# Patient Record
Sex: FEMALE | Race: WHITE | NOT HISPANIC OR LATINO | Employment: OTHER | URBAN - METROPOLITAN AREA
[De-identification: names, ages, dates, MRNs, and addresses within clinical notes are randomized per-mention and may not be internally consistent; named-entity substitution may affect disease eponyms.]

---

## 2019-05-01 ENCOUNTER — APPOINTMENT (OUTPATIENT)
Dept: RADIOLOGY | Facility: MEDICAL CENTER | Age: 84
End: 2019-05-01
Attending: EMERGENCY MEDICINE
Payer: MEDICARE

## 2019-05-01 ENCOUNTER — HOSPITAL ENCOUNTER (OUTPATIENT)
Dept: RADIOLOGY | Facility: MEDICAL CENTER | Age: 84
End: 2019-05-01

## 2019-05-01 ENCOUNTER — HOSPITAL ENCOUNTER (OUTPATIENT)
Facility: MEDICAL CENTER | Age: 84
End: 2019-05-03
Attending: EMERGENCY MEDICINE | Admitting: INTERNAL MEDICINE
Payer: MEDICARE

## 2019-05-01 DIAGNOSIS — D72.829 LEUKOCYTOSIS, UNSPECIFIED TYPE: ICD-10-CM

## 2019-05-01 DIAGNOSIS — S27.0XXA TRAUMATIC PNEUMOTHORAX, INITIAL ENCOUNTER: ICD-10-CM

## 2019-05-01 DIAGNOSIS — S22.41XA CLOSED FRACTURE OF MULTIPLE RIBS OF RIGHT SIDE, INITIAL ENCOUNTER: ICD-10-CM

## 2019-05-01 DIAGNOSIS — R45.1 AGITATION: ICD-10-CM
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
## 2019-05-01 LAB
ANION GAP SERPL CALC-SCNC: 11 MMOL/L (ref 0–11.9)
APPEARANCE UR: CLEAR
BACTERIA #/AREA URNS HPF: ABNORMAL /HPF
BASOPHILS # BLD AUTO: 0.4 % (ref 0–1.8)
BASOPHILS # BLD: 0.07 K/UL (ref 0–0.12)
BILIRUB UR QL STRIP.AUTO: NEGATIVE
BUN SERPL-MCNC: 16 MG/DL (ref 8–22)
CALCIUM SERPL-MCNC: 9.4 MG/DL (ref 8.5–10.5)
CHLORIDE SERPL-SCNC: 106 MMOL/L (ref 96–112)
CO2 SERPL-SCNC: 25 MMOL/L (ref 20–33)
COLOR UR: YELLOW
CREAT SERPL-MCNC: 0.79 MG/DL (ref 0.5–1.4)
EOSINOPHIL # BLD AUTO: 0.02 K/UL (ref 0–0.51)
EOSINOPHIL NFR BLD: 0.1 % (ref 0–6.9)
EPI CELLS #/AREA URNS HPF: NEGATIVE /HPF
ERYTHROCYTE [DISTWIDTH] IN BLOOD BY AUTOMATED COUNT: 47.8 FL (ref 35.9–50)
GLUCOSE SERPL-MCNC: 138 MG/DL (ref 65–99)
GLUCOSE UR STRIP.AUTO-MCNC: NEGATIVE MG/DL
HCT VFR BLD AUTO: 43.7 % (ref 37–47)
HGB BLD-MCNC: 13.8 G/DL (ref 12–16)
HYALINE CASTS #/AREA URNS LPF: ABNORMAL /LPF
IMM GRANULOCYTES # BLD AUTO: 0.08 K/UL (ref 0–0.11)
IMM GRANULOCYTES NFR BLD AUTO: 0.4 % (ref 0–0.9)
INR PPP: 1.11 (ref 0.87–1.13)
KETONES UR STRIP.AUTO-MCNC: NEGATIVE MG/DL
LEUKOCYTE ESTERASE UR QL STRIP.AUTO: ABNORMAL
LYMPHOCYTES # BLD AUTO: 1.45 K/UL (ref 1–4.8)
LYMPHOCYTES NFR BLD: 7.9 % (ref 22–41)
MAGNESIUM SERPL-MCNC: 2 MG/DL (ref 1.5–2.5)
MCH RBC QN AUTO: 30.6 PG (ref 27–33)
MCHC RBC AUTO-ENTMCNC: 31.6 G/DL (ref 33.6–35)
MCV RBC AUTO: 96.9 FL (ref 81.4–97.8)
MICRO URNS: ABNORMAL
MONOCYTES # BLD AUTO: 1.73 K/UL (ref 0–0.85)
MONOCYTES NFR BLD AUTO: 9.5 % (ref 0–13.4)
NEUTROPHILS # BLD AUTO: 14.95 K/UL (ref 2–7.15)
NEUTROPHILS NFR BLD: 81.7 % (ref 44–72)
NITRITE UR QL STRIP.AUTO: POSITIVE
NRBC # BLD AUTO: 0 K/UL
NRBC BLD-RTO: 0 /100 WBC
PH UR STRIP.AUTO: 5 [PH]
PLATELET # BLD AUTO: 315 K/UL (ref 164–446)
PMV BLD AUTO: 9.5 FL (ref 9–12.9)
POTASSIUM SERPL-SCNC: 4.4 MMOL/L (ref 3.6–5.5)
PROT UR QL STRIP: NEGATIVE MG/DL
PROTHROMBIN TIME: 14.4 SEC (ref 12–14.6)
RBC # BLD AUTO: 4.51 M/UL (ref 4.2–5.4)
RBC # URNS HPF: ABNORMAL /HPF
RBC UR QL AUTO: ABNORMAL
SODIUM SERPL-SCNC: 142 MMOL/L (ref 135–145)
SP GR UR STRIP.AUTO: 1.02
UROBILINOGEN UR STRIP.AUTO-MCNC: 0.2 MG/DL
WBC # BLD AUTO: 18.3 K/UL (ref 4.8–10.8)
WBC #/AREA URNS HPF: ABNORMAL /HPF

## 2019-05-01 PROCEDURE — 99285 EMERGENCY DEPT VISIT HI MDM: CPT

## 2019-05-01 PROCEDURE — G0378 HOSPITAL OBSERVATION PER HR: HCPCS

## 2019-05-01 PROCEDURE — 85025 COMPLETE CBC W/AUTO DIFF WBC: CPT

## 2019-05-01 PROCEDURE — 81001 URINALYSIS AUTO W/SCOPE: CPT

## 2019-05-01 PROCEDURE — 85610 PROTHROMBIN TIME: CPT

## 2019-05-01 PROCEDURE — 700105 HCHG RX REV CODE 258: Performed by: INTERNAL MEDICINE

## 2019-05-01 PROCEDURE — 80048 BASIC METABOLIC PNL TOTAL CA: CPT

## 2019-05-01 PROCEDURE — 83735 ASSAY OF MAGNESIUM: CPT

## 2019-05-01 PROCEDURE — 73562 X-RAY EXAM OF KNEE 3: CPT | Mod: RT

## 2019-05-01 PROCEDURE — 73060 X-RAY EXAM OF HUMERUS: CPT | Mod: LT

## 2019-05-01 PROCEDURE — 94760 N-INVAS EAR/PLS OXIMETRY 1: CPT

## 2019-05-01 PROCEDURE — 36415 COLL VENOUS BLD VENIPUNCTURE: CPT

## 2019-05-01 PROCEDURE — 99220 PR INITIAL OBSERVATION CARE,LEVL III: CPT | Performed by: INTERNAL MEDICINE

## 2019-05-01 PROCEDURE — 71045 X-RAY EXAM CHEST 1 VIEW: CPT

## 2019-05-01 RX ORDER — SODIUM CHLORIDE 9 MG/ML
INJECTION, SOLUTION INTRAVENOUS CONTINUOUS
Status: DISCONTINUED | OUTPATIENT
Start: 2019-05-01 | End: 2019-05-02

## 2019-05-01 RX ORDER — BISACODYL 10 MG
10 SUPPOSITORY, RECTAL RECTAL
Status: DISCONTINUED | OUTPATIENT
Start: 2019-05-01 | End: 2019-05-02

## 2019-05-01 RX ORDER — POTASSIUM CHLORIDE 20 MEQ/1
20 TABLET, EXTENDED RELEASE ORAL DAILY
Status: ON HOLD | COMMUNITY
End: 2019-05-03

## 2019-05-01 RX ORDER — FUROSEMIDE 40 MG/1
40 TABLET ORAL DAILY
Status: ON HOLD | COMMUNITY
End: 2019-05-03

## 2019-05-01 RX ORDER — HALOPERIDOL 0.5 MG/1
0.5 TABLET ORAL
COMMUNITY

## 2019-05-01 RX ORDER — POLYETHYLENE GLYCOL 3350 17 G/17G
1 POWDER, FOR SOLUTION ORAL
Status: DISCONTINUED | OUTPATIENT
Start: 2019-05-01 | End: 2019-05-02

## 2019-05-01 RX ORDER — ACETAMINOPHEN 500 MG
500 TABLET ORAL EVERY 6 HOURS PRN
Status: ON HOLD | COMMUNITY
End: 2019-05-03

## 2019-05-01 RX ORDER — ACETAMINOPHEN 325 MG/1
650 TABLET ORAL EVERY 6 HOURS PRN
Status: DISCONTINUED | OUTPATIENT
Start: 2019-05-01 | End: 2019-05-02

## 2019-05-01 RX ORDER — AMOXICILLIN 250 MG
2 CAPSULE ORAL 2 TIMES DAILY
Status: DISCONTINUED | OUTPATIENT
Start: 2019-05-01 | End: 2019-05-02

## 2019-05-01 RX ADMIN — SODIUM CHLORIDE: 9 INJECTION, SOLUTION INTRAVENOUS at 20:55

## 2019-05-01 ASSESSMENT — COPD QUESTIONNAIRES
COPD SCREENING SCORE: 2
DURING THE PAST 4 WEEKS HOW MUCH DID YOU FEEL SHORT OF BREATH: NONE/LITTLE OF THE TIME
HAVE YOU SMOKED AT LEAST 100 CIGARETTES IN YOUR ENTIRE LIFE: NO/DON'T KNOW
DO YOU EVER COUGH UP ANY MUCUS OR PHLEGM?: NO/ONLY WITH OCCASIONAL COLDS OR INFECTIONS

## 2019-05-01 ASSESSMENT — LIFESTYLE VARIABLES: EVER_SMOKED: UNABLE TO EVALUATE AT THIS TIME - NEEDS ASSESSMENT PRIOR TO DISCHARGE

## 2019-05-01 NOTE — ED PROVIDER NOTES
ED Provider Note    CHIEF COMPLAINT  Chief Complaint   Patient presents with   • Rib Injury   • Fall   • Chest Injury       HPI  Radha Fernandes is a 89 y.o. female who presents as a transfer from [facility] for pneumothorax.    Patient presented to the outside facility with complaints of hip and right rib pain.  Films were obtained and demonstrated multiple rib fractures on the right side with subcutaneous emphysema and a small pneumothorax.  Hip film negative.      ALLERGIES  Allergies not on file    CURRENT MEDICATIONS  Home Medications     Reviewed by Sussy Gottlieb (Pharmacy Tech) on 05/01/19 at 1622  Med List Status: Complete   Medication Last Dose Status   acetaminophen (TYLENOL) 500 MG Tab PRN Active   aspirin EC (ECOTRIN) 81 MG Tablet Delayed Response 5/1/2019 Active   furosemide (LASIX) 40 MG Tab 5/1/2019 Active   haloperidol (HALDOL) 0.5 MG Tab PRN Active   Nutritional Supplements (BOOST PO) 5/1/2019 Active   potassium chloride SA (KDUR) 20 MEQ Tab CR 5/1/2019 Active                PAST MEDICAL HISTORY     Unobtainable given patient's dementia    SURGICAL HISTORY  patient denies any surgical history    SOCIAL HISTORY  Social History     Social History Main Topics   • Smoking status: Not on file   • Smokeless tobacco: Not on file   • Alcohol use Not on file   • Drug use: Unknown   • Sexual activity: Not on file     Daughter/guardian that lives in [place]  Co-guardian lives in [place] but is currently in [facility] Hx:  Unobtainable given patient's dementia      REVIEW OF SYSTEMS  Unobtainable given patient's dementia    PHYSICAL EXAM  VITAL SIGNS: /70   Pulse 74   Temp 36.6 °C (97.9 °F) (Temporal)   Resp 20   SpO2 95%     General:  WDthin, nontoxic appearing in NAD; alert; V/S as above  Skin: warm and dry; good color; no rash  HEENT: NCAT; EOMs intact; no scleral icterus   Neck: soft, no JVD, trachea midline  Cardiovascular: Regular heart rate and rhythm.  No  murmurs, rubs, or gallops; pulses 2+ bilaterally radially   Lungs: Clear to auscultation with good air movement bilaterally.  No wheezes, rhonchi, or rales.   Chest wall: Tender to palpation over the right lateral chest wall with crepitus  Abdomen: BS present; soft; NTND; no rebound, guarding, or rigidity.  No organomegaly or pulsatile mass  Extremities: BEDOLLA x 4; no e/o trauma; no pedal edema  Neurologic: CNs III-XII grossly intact; speech clear; distal sensation intact; strength 5/5 UE/LEs  Psychiatric: Appropriate affect, normal mood    LABS  Results for orders placed or performed during the hospital encounter of 05/01/19   Prothrombin Time   Result Value Ref Range    PT 14.4 12.0 - 14.6 sec    INR 1.11 0.87 - 1.13   CBC WITH DIFFERENTIAL   Result Value Ref Range    WBC 18.3 (H) 4.8 - 10.8 K/uL    RBC 4.51 4.20 - 5.40 M/uL    Hemoglobin 13.8 12.0 - 16.0 g/dL    Hematocrit 43.7 37.0 - 47.0 %    MCV 96.9 81.4 - 97.8 fL    MCH 30.6 27.0 - 33.0 pg    MCHC 31.6 (L) 33.6 - 35.0 g/dL    RDW 47.8 35.9 - 50.0 fL    Platelet Count 315 164 - 446 K/uL    MPV 9.5 9.0 - 12.9 fL    Neutrophils-Polys 81.70 (H) 44.00 - 72.00 %    Lymphocytes 7.90 (L) 22.00 - 41.00 %    Monocytes 9.50 0.00 - 13.40 %    Eosinophils 0.10 0.00 - 6.90 %    Basophils 0.40 0.00 - 1.80 %    Immature Granulocytes 0.40 0.00 - 0.90 %    Nucleated RBC 0.00 /100 WBC    Neutrophils (Absolute) 14.95 (H) 2.00 - 7.15 K/uL    Lymphs (Absolute) 1.45 1.00 - 4.80 K/uL    Monos (Absolute) 1.73 (H) 0.00 - 0.85 K/uL    Eos (Absolute) 0.02 0.00 - 0.51 K/uL    Baso (Absolute) 0.07 0.00 - 0.12 K/uL    Immature Granulocytes (abs) 0.08 0.00 - 0.11 K/uL    NRBC (Absolute) 0.00 K/uL   Basic Metabolic Panel   Result Value Ref Range    Sodium 142 135 - 145 mmol/L    Potassium 4.4 3.6 - 5.5 mmol/L    Chloride 106 96 - 112 mmol/L    Co2 25 20 - 33 mmol/L    Glucose 138 (H) 65 - 99 mg/dL    Bun 16 8 - 22 mg/dL    Creatinine 0.79 0.50 - 1.40 mg/dL    Calcium 9.4 8.5 - 10.5 mg/dL     Anion Gap 11.0 0.0 - 11.9   ESTIMATED GFR   Result Value Ref Range    GFR If African American >60 >60 mL/min/1.73 m 2    GFR If Non African American >60 >60 mL/min/1.73 m 2         IMAGING  DX-CHEST-PORTABLE (1 VIEW)   Final Result      1.  Small RIGHT pneumothorax   2.  RIGHT rib fractures   3.  RIGHT lateral chest wall and neck base subcutaneous emphysema   4.  Cardiomegaly   5.  Atherosclerosis      OUTSIDE IMAGES-CT CHEST   Final Result      [id number] FILM X-RAY   Final Result      OUTSIDE IMAGES-DX PELVIS   Final Result          MEDICAL RECORD  I have reviewed patient's medical record and pertinent results are listed below.    CT chest results: Small anterior right-sided pneumothorax.  No effusion, no mediastinal hematoma.  Extensive subcu air is noted within the right chest wall.  Acute right sixth, seventh, and eighth rib fractures are noted.    COURSE & MEDICAL DECISION MAKING  I have reviewed any medical record information, laboratory studies and radiographic results as noted.    Radha Fernandes is a 89 y.o. female who presents for evaluation of small pneumothorax with subcutaneous emphysema and associated multiple rib fractures on the right following a ground-level fall per reports.  Patient is demented and unable to give history.  No evidence of head trauma.  Patient is on aspirin.    Repeat chest x-ray was obtained here in the ER.  It reveals a small pneumothorax.    15:59  I consulted trauma surgery who will come see the patient.    I called the patient's guardian (see secondary to ED provider note)    16:17  I discussed the case with the hospitalist who agrees to admit the patient    I have ordered a left humerus and right knee x-ray as well.  These results are still pending at the time of signout to the hospitalist.    CT head canceled given the risks versus benefits of obtaining one as she would require sedation.  She is reportedly at neurologic baseline with her dementia, there is no evidence  of head trauma and the family requests no surgical interventions or procedures be done.  The patient is DNR.    FINAL IMPRESSION  1. Fall, initial encounter    2. Closed fracture of multiple ribs of right side, initial encounter    3. Traumatic pneumothorax, initial encounter    4. Leukocytosis, unspecified type      Electronically signed by: Camryn Leyva, 5/1/2019 1:43 PM

## 2019-05-01 NOTE — H&P
Hospital Medicine History & Physical Note    Date of Service  5/1/2019    Primary Care Physician  No primary care provider on file.    Consultants  Surgery (Dr. Gregg)    Code Status  DNR/DNI    Chief Complaint  Transferred from outside facility for pneumothorax    History of Presenting Illness    89 y.o. female who presented to the hospital on 5/1/2019 as a transfer from [facility] after her CT scan of chest without contrast showed: Acute right 6, 7 and 8 rib fracture with a small associated anterior right pneumothorax.  Extensive subcutaneous emphysema within the right chest wall.  She found to have CPK of 150.  X-ray right hip showed unremarkable radiograph of the right hip and pelvis.  Patient is non-communicative and I reviewed her record from outside facility and also I discussed with ED physician Dr. Leyva and also with surgeon Dr. Gregg    ED course: I discussed with ED physician Dr. Leyva and she discussed with patient's guardian and coguardian and as per them patient is DNR/DNI and they do not want any invasive procedures.  I personally discussed with Dr. Gregg regarding this patient and offer discussion with patient's guardians Beba and decided to do conservative management.    Review of Systems  Review of Systems   Unable to perform ROS: Mental acuity       Past Medical History  Unable to obtain    Surgical History  Unable to obtain    Family History  Unable to obtain    Social History   Unable to obtain    Allergies  No Known Allergies    Medications  Prior to Admission Medications   Prescriptions Last Dose Informant Patient Reported? Taking?   Nutritional Supplements (BOOST PO) 5/1/2019 at 0700 MAR from Other Facility Yes Yes   Sig: Take 1 Dose by mouth 2 Times a Day.   acetaminophen (TYLENOL) 500 MG Tab PRN at PRN MAR from Other Facility Yes Yes   Sig: Take 500 mg by mouth every 6 hours as needed.   aspirin EC (ECOTRIN) 81 MG Tablet Delayed Response 5/1/2019 at 0700 MAR from  Other Facility Yes Yes   Sig: Take 81 mg by mouth every day.   furosemide (LASIX) 40 MG Tab 5/1/2019 at 0700 MAR from Other Facility Yes Yes   Sig: Take 40 mg by mouth every day.   haloperidol (HALDOL) 0.5 MG Tab PRN at PRN MAR from Other Facility Yes Yes   Sig: Take 0.5 mg by mouth 1 time daily as needed. Pt takes once daily before shower as needed   potassium chloride SA (KDUR) 20 MEQ Tab CR 5/1/2019 at 0700 MAR from Other Facility Yes Yes   Sig: Take 20 mEq by mouth every day.      Facility-Administered Medications: None       Physical Exam  Temp:  [36.6 °C (97.9 °F)] 36.6 °C (97.9 °F)  Pulse:  [69-74] 74  Resp:  [20] 20  BP: (135)/(70) 135/70  SpO2:  [95 %-96 %] 95 %    Physical Exam   Constitutional: No distress.   She was agitated during exam  Elderly sick appearing   HENT:   Head: Normocephalic and atraumatic.   Eyes: Pupils are equal, round, and reactive to light. Right eye exhibits no discharge. Left eye exhibits no discharge.   Neck: Normal range of motion.   Cardiovascular: Normal rate, regular rhythm and normal heart sounds.    Pulmonary/Chest: Effort normal and breath sounds normal. She exhibits tenderness (Mostly on right side).   Abdominal: Soft. Bowel sounds are normal. She exhibits no distension. There is no tenderness.   Musculoskeletal: Normal range of motion. She exhibits no edema or tenderness.   Neurological: No cranial nerve deficit (Grossly intact).   She is moving all extremities.  She is not following commands  Unable to perform complete neuro exam   Skin: Skin is warm and dry. No erythema.   Psychiatric:   Unable to perform psychiatric evaluation       Laboratory:  Recent Labs      05/01/19   1445   WBC  18.3*   RBC  4.51   HEMOGLOBIN  13.8   HEMATOCRIT  43.7   MCV  96.9   MCH  30.6   MCHC  31.6*   RDW  47.8   PLATELETCT  315   MPV  9.5     Recent Labs      05/01/19   1445   SODIUM  142   POTASSIUM  4.4   CHLORIDE  106   CO2  25   GLUCOSE  138*   BUN  16   CREATININE  0.79   CALCIUM  9.4      Recent Labs      05/01/19   1445   GLUCOSE  138*     Recent Labs      05/01/19   1445   INR  1.11             No results for input(s): TROPONINI in the last 72 hours.    Urinalysis:    No results found     Imaging:  DX-KNEE 3 VIEWS RIGHT   Final Result      1.  Possible small suprapatellar joint effusion without definite acute underlying osseous abnormality.   2.  Demineralization and degenerative changes.      DX-HUMERUS 2+ LEFT   Final Result      Demineralization without definite acute osseous abnormality.      DX-CHEST-PORTABLE (1 VIEW)   Final Result      1.  Small RIGHT pneumothorax   2.  RIGHT rib fractures   3.  RIGHT lateral chest wall and neck base subcutaneous emphysema   4.  Cardiomegaly   5.  Atherosclerosis      OUTSIDE IMAGES-CT CHEST   Final Result      [id number] FILM X-RAY   Final Result      OUTSIDE IMAGES-DX PELVIS   Final Result            Assessment/Plan:  I anticipate this patient is appropriate for observation status at this time.    Traumatic pneumothorax   Assessment & Plan    She found to have pneumothorax and acute rib fracture with extensive subcutaneous emphysema within the right chest wall.  Patient is DNR/DNI.  ED physician and surgeon discussed with patient's guardian and expressed that patient is DNR/DNI and after discussion no invasive procedure was recommended.  I have personally discussed this case with surgeon Dr. Gregg.  Patient currently is not in respiratory distress.  I requested palliative consult for discussing further plan of care.       Leukocytosis   Assessment & Plan    Most likely reactionary to fall and pneumothorax.       Agitation   Assessment & Plan    She has agitation and she is mildly combative.  I resume her haloperidol from outpatient.  After discussing with RN order soft restraints as she was pulling IV lines.  Her urine showed 2-5 WBC and positive nitrite.  She is afebrile.  She may need antibiotic therapy if she develops symptoms including  fever.           VTE prophylaxis: SCDs

## 2019-05-01 NOTE — ED PROVIDER NOTES
Patient's guardian is Fito Macias.  He can be reached at [phone].  POLST forms/paperwork regarding pt's code status.  He will bring this to [facility] this evening.  He states verbally that the patient does not want to be on life support.    Per Beab Payton (pt's co-guardian) expressed to Fito earlier today that she does not want any surgeries or invasive procedures.  She can be reached at [phone].  Lives in [place].  Arriving tomorrow midday to [place].

## 2019-05-01 NOTE — ED NOTES
Watsena on a MAR from [facility] to be faxed over. Will completed med rec when fax is received.

## 2019-05-01 NOTE — ED TRIAGE NOTES
Patient is transfer from [facility], patient fell and is positive for three rib fractures on right side, patient has history of dementia and is only oriented to self and pain. VS stable, per transferring facility patient has small pneumothorax on right side

## 2019-05-02 PROBLEM — R45.1 AGITATION: Status: ACTIVE | Noted: 2019-05-02

## 2019-05-02 PROBLEM — S27.0XXA TRAUMATIC PNEUMOTHORAX: Status: ACTIVE | Noted: 2019-05-02

## 2019-05-02 PROBLEM — D72.829 LEUKOCYTOSIS: Status: ACTIVE | Noted: 2019-05-02

## 2019-05-02 LAB
ALBUMIN SERPL BCP-MCNC: 3.9 G/DL (ref 3.2–4.9)
ALBUMIN/GLOB SERPL: 1.6 G/DL
ALP SERPL-CCNC: 43 U/L (ref 30–99)
ALT SERPL-CCNC: 7 U/L (ref 2–50)
ANION GAP SERPL CALC-SCNC: 9 MMOL/L (ref 0–11.9)
AST SERPL-CCNC: 27 U/L (ref 12–45)
BASOPHILS # BLD AUTO: 0.3 % (ref 0–1.8)
BASOPHILS # BLD: 0.04 K/UL (ref 0–0.12)
BILIRUB SERPL-MCNC: 2.8 MG/DL (ref 0.1–1.5)
BUN SERPL-MCNC: 16 MG/DL (ref 8–22)
CALCIUM SERPL-MCNC: 9 MG/DL (ref 8.5–10.5)
CHLORIDE SERPL-SCNC: 109 MMOL/L (ref 96–112)
CO2 SERPL-SCNC: 24 MMOL/L (ref 20–33)
CREAT SERPL-MCNC: 0.78 MG/DL (ref 0.5–1.4)
EOSINOPHIL # BLD AUTO: 0.04 K/UL (ref 0–0.51)
EOSINOPHIL NFR BLD: 0.3 % (ref 0–6.9)
ERYTHROCYTE [DISTWIDTH] IN BLOOD BY AUTOMATED COUNT: 46.1 FL (ref 35.9–50)
GLOBULIN SER CALC-MCNC: 2.5 G/DL (ref 1.9–3.5)
GLUCOSE SERPL-MCNC: 95 MG/DL (ref 65–99)
HCT VFR BLD AUTO: 42.5 % (ref 37–47)
HGB BLD-MCNC: 13.8 G/DL (ref 12–16)
IMM GRANULOCYTES # BLD AUTO: 0.04 K/UL (ref 0–0.11)
IMM GRANULOCYTES NFR BLD AUTO: 0.3 % (ref 0–0.9)
LYMPHOCYTES # BLD AUTO: 1.92 K/UL (ref 1–4.8)
LYMPHOCYTES NFR BLD: 13.3 % (ref 22–41)
MCH RBC QN AUTO: 30.8 PG (ref 27–33)
MCHC RBC AUTO-ENTMCNC: 32.5 G/DL (ref 33.6–35)
MCV RBC AUTO: 94.9 FL (ref 81.4–97.8)
MONOCYTES # BLD AUTO: 1.81 K/UL (ref 0–0.85)
MONOCYTES NFR BLD AUTO: 12.5 % (ref 0–13.4)
NEUTROPHILS # BLD AUTO: 10.64 K/UL (ref 2–7.15)
NEUTROPHILS NFR BLD: 73.3 % (ref 44–72)
NRBC # BLD AUTO: 0 K/UL
NRBC BLD-RTO: 0 /100 WBC
PLATELET # BLD AUTO: 301 K/UL (ref 164–446)
PMV BLD AUTO: 9.7 FL (ref 9–12.9)
POTASSIUM SERPL-SCNC: 4 MMOL/L (ref 3.6–5.5)
PROT SERPL-MCNC: 6.4 G/DL (ref 6–8.2)
RBC # BLD AUTO: 4.48 M/UL (ref 4.2–5.4)
SODIUM SERPL-SCNC: 142 MMOL/L (ref 135–145)
WBC # BLD AUTO: 14.5 K/UL (ref 4.8–10.8)

## 2019-05-02 PROCEDURE — G0378 HOSPITAL OBSERVATION PER HR: HCPCS

## 2019-05-02 PROCEDURE — 80053 COMPREHEN METABOLIC PANEL: CPT

## 2019-05-02 PROCEDURE — 99497 ADVNCD CARE PLAN 30 MIN: CPT | Performed by: HOSPITALIST

## 2019-05-02 PROCEDURE — 96374 THER/PROPH/DIAG INJ IV PUSH: CPT

## 2019-05-02 PROCEDURE — 99225 PR SUBSEQUENT OBSERVATION CARE,LEVEL II: CPT | Mod: 25 | Performed by: HOSPITALIST

## 2019-05-02 PROCEDURE — 85025 COMPLETE CBC W/AUTO DIFF WBC: CPT

## 2019-05-02 PROCEDURE — 36415 COLL VENOUS BLD VENIPUNCTURE: CPT

## 2019-05-02 PROCEDURE — 700105 HCHG RX REV CODE 258: Performed by: INTERNAL MEDICINE

## 2019-05-02 PROCEDURE — 700111 HCHG RX REV CODE 636 W/ 250 OVERRIDE (IP): Performed by: NURSE PRACTITIONER

## 2019-05-02 RX ORDER — MORPHINE SULFATE 4 MG/ML
1 INJECTION, SOLUTION INTRAMUSCULAR; INTRAVENOUS
Status: DISCONTINUED | OUTPATIENT
Start: 2019-05-02 | End: 2019-05-03 | Stop reason: HOSPADM

## 2019-05-02 RX ORDER — ATROPINE SULFATE 10 MG/ML
2 SOLUTION/ DROPS OPHTHALMIC EVERY 4 HOURS PRN
Status: DISCONTINUED | OUTPATIENT
Start: 2019-05-02 | End: 2019-05-03 | Stop reason: HOSPADM

## 2019-05-02 RX ORDER — LORAZEPAM 2 MG/ML
1 CONCENTRATE ORAL
Status: DISCONTINUED | OUTPATIENT
Start: 2019-05-02 | End: 2019-05-03 | Stop reason: HOSPADM

## 2019-05-02 RX ORDER — LORAZEPAM 2 MG/ML
1 INJECTION INTRAMUSCULAR
Status: DISCONTINUED | OUTPATIENT
Start: 2019-05-02 | End: 2019-05-03 | Stop reason: HOSPADM

## 2019-05-02 RX ORDER — HALOPERIDOL 2 MG/ML
0.5 SOLUTION ORAL
Status: DISCONTINUED | OUTPATIENT
Start: 2019-05-02 | End: 2019-05-02

## 2019-05-02 RX ADMIN — SODIUM CHLORIDE: 9 INJECTION, SOLUTION INTRAVENOUS at 09:17

## 2019-05-02 RX ADMIN — LORAZEPAM 1 MG: 2 INJECTION INTRAMUSCULAR at 17:21

## 2019-05-02 NOTE — DISCHARGE PLANNING
Anticipated Discharge Disposition: Home to [facility] with Hospice    Action: LSW spoke with pt's daughter, Beba Coello ([phone]) at bedside who is agreeable to Hospice. All questions answered at this time.     LSW spoke with [facility] (Sofia) who stated that they will be accepting pt and able to get everything for pt to [facility] by tomorrow morning. Sofia stated she has met with pt's daughter as well.     LSW spoke with [facility] who stated that they will be able to take pt even though she was recently on restraints. They are able to accept pt in the morning.     Barriers to Discharge: None    Plan: Pt to transport to [facility] on 5/3/19 at 1100 via [facility].

## 2019-05-02 NOTE — PROGRESS NOTES
Spoke to hospitalist Dr. Uribe regarding patient being combative and pulling IV lines. Received new order of soft wrist restrains.

## 2019-05-02 NOTE — PROGRESS NOTES
Hospital Medicine Daily Progress Note    Date of Service  5/2/2019    Chief Complaint  89 y.o. female admitted 5/1/2019 with pnemothorax, multiple R rib fx following fall at LTCF    Hospital Course    Patient transferred from the [facility] after falling at her long-term care facility at [facility] in [place]. Patient has history of end stage dementia, but has no other medical history on file. GS consult in the emergency room led to discussion with family/DPOA, who confirmed the patient did not want further aggressive medical intervention when she was lucid enough to make her wishes known. She was monitored overnight on IV fluids. She is non-communicative on exam and does not follow.  She is DNR/DNI.  She is high risk for aspiration pneumonia, community acquired pneumonia or respiratory deterioration given her untreated pneumothorax.    I discussed the case in detail with the patient's second alternate on the DPOA, who confirms the patients family wishes her to transition to comfort care.  However the definite decision was deferred to the primary decision maker for medical decisions, the patient's oldest daughter who arrived at 5/2/2019 to discuss her care.  We discussed the irreversible course of dementia, as well as the appropriate treatment for her pneumothorax and, potentially, infectious processes, not excluding UTI or pneumonia.  She has discussed the case in detail with her sister and feels they agree the patient should be transition to comfort care.  They have filled out a physician order for life sustaining treatment form and requested consultation from hospice.  She can be transferred back when accepted to her previous long-term care facility where the family does not wish for her to receive IV hydration or enteral feedings.  She can be given oral nutrition for comfort and appropriate medication for her level of care.      Interval Problem Update  Leuks -- declining  14.5  H/H stable and WNL  92% on RA  Doesn't follow/track  Appears in mild pain; thin  Long conversation with DPOA/Daughter, who flew in from [place]    Consultants/Specialty  GS - off    Code Status  DNR/DNI -- now on comfort care    Disposition  LTCF with hospice    Review of Systems  Review of Systems   Unable to perform ROS: Medical condition        Physical Exam  Temp:  [36.7 °C (98.1 °F)-36.8 °C (98.2 °F)] 36.7 °C (98.1 °F)  Pulse:  [69-83] 70  Resp:  [16-20] 16  BP: (110-122)/(51-81) 113/81  SpO2:  [89 %-97 %] 92 %    Physical Exam   Constitutional: She is oriented to person, place, and time. She appears well-developed. She appears cachectic. She appears ill.   HENT:   Head: Normocephalic and atraumatic.   Mouth/Throat: Abnormal dentition. Dental caries present.   Eyes: Pupils are equal, round, and reactive to light.   Neck: Neck supple.   Cardiovascular: Normal rate, regular rhythm and normal heart sounds.    Pulmonary/Chest: Effort normal. No respiratory distress. She has decreased breath sounds in the right upper field, the right middle field and the right lower field. She has no wheezes. She has no rales.   Tender R chest wall   Abdominal: Soft. She exhibits no distension. There is no tenderness.   Neurological: She is alert and oriented to person, place, and time. No cranial nerve deficit.   Skin: Skin is warm and dry.       Fluids  No intake or output data in the 24 hours ending 05/02/19 1352    Laboratory  Recent Labs      05/01/19   1445  05/02/19   0416   WBC  18.3*  14.5*   RBC  4.51  4.48   HEMOGLOBIN  13.8  13.8   HEMATOCRIT  43.7  42.5   MCV  96.9  94.9   MCH  30.6  30.8   MCHC  31.6*  32.5*   RDW  47.8  46.1   PLATELETCT  315  301   MPV  9.5  9.7     Recent Labs      05/01/19   1445  05/02/19   0416   SODIUM  142  142   POTASSIUM  4.4  4.0   CHLORIDE  106  109   CO2  25  24   GLUCOSE  138*  95   BUN  16  16   CREATININE  0.79  0.78   CALCIUM  9.4  9.0     Recent Labs      05/01/19   1445   INR   1.11               Imaging  DX-KNEE 3 VIEWS RIGHT   Final Result      1.  Possible small suprapatellar joint effusion without definite acute underlying osseous abnormality.   2.  Demineralization and degenerative changes.      DX-HUMERUS 2+ LEFT   Final Result      Demineralization without definite acute osseous abnormality.      DX-CHEST-PORTABLE (1 VIEW)   Final Result      1.  Small RIGHT pneumothorax   2.  RIGHT rib fractures   3.  RIGHT lateral chest wall and neck base subcutaneous emphysema   4.  Cardiomegaly   5.  Atherosclerosis      OUTSIDE IMAGES-CT CHEST   Final Result      [id number] FILM X-RAY   Final Result      OUTSIDE IMAGES-DX PELVIS   Final Result           Assessment/Plan  Traumatic pneumothorax   Assessment & Plan    She found to have pneumothorax and acute rib fracture with extensive subcutaneous emphysema within the right chest wall.  Patient is DNR/DNI.  ED physician and surgeon discussed with patient's guardian and expressed that patient is DNR/DNI and after discussion no invasive procedure was recommended.  I have personally discussed this case with surgeon Dr. Gregg.  Case has been discussed with the patient's DPOA third alternate who is at bedside and again with the patient's daughter who is the primary decision maker listed on the DPOA document.      Given the patient's end-stage dementia and high risk for clinical deterioration the family has elected to place the patient on comfort care. Hospice referral placed.     Leukocytosis   Assessment & Plan    Most likely reactionary to fall and pneumothorax.  Her urine showed 2-5 WBC and positive nitrite.  She is afebrile.  Transitioned to        Agitation   Assessment & Plan    Resume her haloperidol from outpatient.  After discussing with RN order soft restraints as she was pulling IV lines.            VTE prophylaxis: None/comfort care

## 2019-05-02 NOTE — CARE PLAN
Problem: Safety  Goal: Will remain free from falls    Intervention: Assess risk factors for falls  Soft wrist restraints in use per MD order, bed alarm in use, call light within reach, bed locked in lowest position, non-skid socks in place, clutter-free environment. Patient is not oriented and does not use call light appropriately. Frequent rounding by RN.      Problem: Skin Integrity  Goal: Risk for impaired skin integrity will decrease    Intervention: Assess risk factors for impaired skin integrity and/or pressure ulcers  Assessed for signs of skin breakdown. Encouraging increased mobility and repositioning to prevent development of pressure ulcers. Q 2 turns in place. Waffle overlay in place. Heel float boots in use. Barrier cream and moisturizer.

## 2019-05-02 NOTE — ASSESSMENT & PLAN NOTE
Most likely reactionary to fall and pneumothorax.  Her urine showed 2-5 WBC and positive nitrite.  She is afebrile.  Transitioned to CC

## 2019-05-02 NOTE — DISCHARGE PLANNING
Received Choice form at 1035.  Agency/Facility Name: [facility]   Referral sent per Choice form at 1040.

## 2019-05-02 NOTE — CARE PLAN
Problem: Communication  Goal: The ability to communicate needs accurately and effectively will improve    Intervention: Reorient patient to environment as needed  Patient confused, re-oriented to room and environment and call light use. On high risk for falls, bed alarm utilized.      Problem: Skin Integrity  Goal: Risk for impaired skin integrity will decrease    Intervention: Implement precautions to protect skin integrity in collaboration with the interdisciplinary team  Waffle overlay in place, prevalon boots used to float heels.

## 2019-05-02 NOTE — PROGRESS NOTES
· 2 RN skin check complete.  · Devices in place: soft wrist restraints, SCDs, heel float boots.  · Skin assessed under devices: no skin breakdown.  · Confirmed pressure ulcers found on: n/a.  · Findings: bruising and scabs noted to bilateral upper extremities, discoloration to upper and lower extremities, elbows, heels, and sacrum are pink and blanching.  · The following interventions in place: Q 2 turns in place, waffle overlay, heel float boots, barrier cream/moisturizer.

## 2019-05-02 NOTE — DISCHARGE PLANNING
"Anticipated Discharge Disposition: Home to [facility] on Hospice    Action: LSW spoke with Fito at bedside. Fito stated he spoke to  about hospice and states that \"it is what Radha and her late  wanted for her\". Fito made choice of [facility]. LSW faxed choice form to [facility].     Barriers to Discharge: None    Plan: Speak with pt's daughter, Beba, at bedside once she arrives. Awaiting Hospice acceptance. Arrange transportation to [facility] upon medical clearance.     "

## 2019-05-02 NOTE — ASSESSMENT & PLAN NOTE
Resume her haloperidol from outpatient.  After discussing with RN order soft restraints as she was pulling IV lines.

## 2019-05-02 NOTE — DISCHARGE PLANNING
Anticipated Discharge Disposition: Home to [facility]     Action: Assessment completed. LSW spoke with Fito Macias ([phone]), pt's court appointed guardian, along with pt's daughter, Beba Coello ([phone]). Fito stated that pt came from the emergency room in [place]. Pt resides at [facility] ([phone]) where she has staff to support her. Fito gave permission for LSW to call them to inform them of pt. Pt is active in her ADLs, according to Fito. However, she needs reminders and help bathing because she often resists bathing. Fito stated he would be at bedside shortly and will bring DPOA with him.     LSW called [facility] ([phone]) to discuss pt's discharge back. LSW spoke with Evan and Cachorro who stated they would love to take pt back but would like more information medically. They also would like pt assessed for hospice. LSW spoke with Hospitalist RN and got medical update [facility]     LSW spoke with Evan with [facility] to update them on pt medically.     Barriers to Discharge: None    Plan: IDT to discuss hospice with Fito. Obtain paperwork from Fito and scan into chart. Meet with Fito at bedside. Arrange transport for pt to return to [facility].     Care Transition Team Assessment    Information Source  Orientation : Unable to Assess  Information Given By: Legal Guardian  Informant's Name: Fito Macias    Readmission Evaluation  Is this a readmission?: No    Elopement Risk  Legal Hold: No  Ambulatory or Self Mobile in Wheelchair: No-Not an Elopement Risk  Elopement Risk: Not at Risk for Elopement    Discharge Preparedness  What is your plan after discharge?: Home with help ([facility])  What are your discharge supports?: Guardian, Child, Other (comment) (Staff at [facility])  Prior Functional Level: Independent with Activities of Daily Living,  Independent with Medication Management, Ambulatory  Difficulity with ADLs: Bathing  Difficulty with ADLs Comment: Fito stated that pt needs help knowing when to bath  Difficulity with IADLs: Driving, Shopping    Functional Assesment  Prior Functional Level: Independent with Activities of Daily Living, Independent with Medication Management, Ambulatory    Finances  Financial Barriers to Discharge: No  Prescription Coverage: Yes    Advance Directive  Advance Directive?: DPOA for Health Care  Durable Power of  Name and Contact : Fito Macias    Psychological Assessment  Non-compliant with Treatment: No    Discharge Risks or Barriers  Discharge risks or barriers?: No PCP, Transportation  Patient risk factors: Vulnerable adult, No PCP    Anticipated Discharge Information  Anticipated discharge disposition: Assisted living  Discharge Address: [facility]  Discharge Contact Phone Number: [phone]

## 2019-05-02 NOTE — CONSULTS
Surgical History and Physical    Date of Service: 5/1/2019    Requesting Physician: Camryn LeyvaMD - ER    PCP: No primary care provider on file.    Reason for Consult: Blunt chest trauma, rib fractures, small pneumothorax, subcutaneous emphysema    HPI: This is a 89 y.o. female resident of a nursing home with advanced stage dementia who is presenting from [place] where she sustained a fall earlier today.  She struck her right chest.  The patient is DNR/DNI with an advanced directive that outlines no aggressive procedures or interventions should be undertaken.  In spite of this, she was taken to a care facility in [place] where she was diagnosed with the above injuries.  She had no respiratory distress and her oxygen saturations were stable on room air at this facility.  The medical practitioner at the care facility in [place] felt it was in the patient's best interest to get transferred to a higher level of care for evaluation by the trauma service for chest tube placement and management -- an aggressive, invasive procedure.      The patient was evaluated at bedside in the ER.  She is non-communicative.  She is unable to participate in history taking due to her dementia.    Any information below was obtained through EPIC review.    PAST MEDICAL HISTORY:   Unable to obtain due to patient condition    PAST SURGICAL HISTORY:   Unable to obtain due to patient condition     ALLERGIES: Patient has no known allergies.       CURRENT MEDICATIONS:   Outpatient Prescriptions Marked as Taking for the 5/1/19 encounter (Hospital Encounter)   Medication Sig   • aspirin EC (ECOTRIN) 81 MG Tablet Delayed Response Take 81 mg by mouth every day.   • furosemide (LASIX) 40 MG Tab Take 40 mg by mouth every day.   • potassium chloride SA (KDUR) 20 MEQ Tab CR Take 20 mEq by mouth every day.   • Nutritional Supplements (BOOST PO) Take 1 Dose by mouth 2 Times a Day.   • haloperidol (HALDOL) 0.5 MG Tab Take 0.5 mg by mouth 1 time  daily as needed. Pt takes once daily before shower as needed   • acetaminophen (TYLENOL) 500 MG Tab Take 500 mg by mouth every 6 hours as needed.         FAMILY HISTORY:   Unable to obtain due to patient condition    SOCIAL HISTORY:   Unable to obtain due to patient condition      Review of Systems:  Unable to obtain due to patient condition.    Physical Exam:  /70   Pulse 74   Temp 36.6 °C (97.9 °F) (Temporal)   Resp 20   Wt 49.9 kg (110 lb 0.2 oz)   SpO2 95%   Vitals:    05/01/19 1511   BP:    Pulse: 74   Resp:    Temp:    SpO2: 95%     GENERAL:  The patient is elderly, frail, cachectic appearing and in mild acute distress  HEENT:  Atraumatic, normocephalic.  Normal pinna bilaterally.  External auditory canals are without discharge.  Conjunctivae and sclerae are clear. Extraocular movements are full. Pupils are equal, round, and reactive to light.  Oral mucosa is moist.  NECK:  Soft and supple without lymphadenopathy. No masses are noted.  Trachea is midline.  There is extensive subcutaneous emphysema tracking up into the right neck.  CHEST:  Lungs are clear to auscultation bilaterally.  She is tender along the right chest wall.  There is extensive subcutaneous emphysema of the right chest wall.     CARDIOVASCULAR:  Regular rate and rhythm.  No murmurs appreciated.  No JVD.  Palpable pulses present in all four extremities.    ABDOMEN:  Soft, non-tender, non-distended.  Non-tympanitic.  No hepatomegaly or splenomegaly.  No incisional, umbilical, or inguinal hernias were appreciated..  SKIN:  Warm and well perfused. No rashes.  NEUROLOGIC:  Awake. Cranial nerves II through XII are grossly intact. Motor and sensory exams are normal in all four extremities. Motor and sensory reflexes are 2+ and symmetric with bilateral plantar responses.  PSYCHIATRIC: Affect and mood is altered due to underlying dementia    Labs:  Recent Labs      05/01/19   1445   WBC  18.3*   RBC  4.51   HEMOGLOBIN  13.8   HEMATOCRIT   43.7   MCV  96.9   MCH  30.6   MCHC  31.6*   RDW  47.8   PLATELETCT  315   MPV  9.5     Recent Labs      05/01/19   1445   SODIUM  142   POTASSIUM  4.4   CHLORIDE  106   CO2  25   GLUCOSE  138*   BUN  16   CREATININE  0.79   CALCIUM  9.4     Recent Labs      05/01/19   1445   INR  1.11     Recent Labs      05/01/19   1445   INR  1.11       Radiology:  DX-KNEE 3 VIEWS RIGHT   Final Result      1.  Possible small suprapatellar joint effusion without definite acute underlying osseous abnormality.   2.  Demineralization and degenerative changes.      DX-HUMERUS 2+ LEFT   Final Result      Demineralization without definite acute osseous abnormality.      DX-CHEST-PORTABLE (1 VIEW)   Final Result      1.  Small RIGHT pneumothorax   2.  RIGHT rib fractures   3.  RIGHT lateral chest wall and neck base subcutaneous emphysema   4.  Cardiomegaly   5.  Atherosclerosis      OUTSIDE IMAGES-CT CHEST   Final Result      [id number] FILM X-RAY   Final Result      OUTSIDE IMAGES-DX PELVIS   Final Result      All imaging from the referring car facility was reviewed personally.    Assessment/Plan:   1) Multiple Rib Fractures:  2) Right Pneumothorax:    I had a phone conversation with both of this patient's guardians, Beba and Fito.  Both reiterated this patient's DNR/DNI status.  Furthermore, they both agreed that an aggressive, invasive procedure such as a chest tube in this severely demented patient of advanced age was not in her best interests or aligned with her wishes prior to becoming demented.  From a respiratory standpoint, she is stable.  This subcutaneous emphysema and pneumothorax may not develop into a life threatening problem, but it is hard to predict this as most patients that want to be treated would get a chest tube.  The patient is also high risk for pneumonia due to her multiple rib fractures and a chest tube would not change this.    Conservative measures for the above conditions  No procedures or  interventions planned   Discuss with Dr. Uribe  Will sign off    ____________________________________   Juan ARNOLD / BAIRON     DD: 5/1/2019   DT: 5:24 PM

## 2019-05-02 NOTE — PROGRESS NOTES
· 2 RN skin check complete with RODNEY Cornejo.  · Devices in place: SCDs, soft wrist restrains  · Skin assessed under devices: Yes, skin intact  · Confirmed pressure ulcers found on: no pressure injury noted  · Bruising and scabs noted to bilateral upper extremities, discoloration also noted to upper and lower extremities. Sacrum and heels pink and blanching  · The following interventions in place: waffle overlay, prevalon boots in use to float heels, patient able to turn self from side to side.

## 2019-05-02 NOTE — PROGRESS NOTES
POLST completed at bedside with patient's daughter, Beba, and Jacob MEDINA. Discussed plan of care with Beba and answered all questions at this time. Soft wrist restraints continue to be in place per Jacob, patient receiving IVF. Safety and fall precautions in place.

## 2019-05-02 NOTE — DISCHARGE PLANNING
Agency/Facility Name: [facility]  Outcome: Patient accepted.    Received Transport Form at 1635.  Spoke to Antoinette at [facility]    Transport is scheduled for 5/3 at 1000 going to home in [place].    SHELL Curry notified.

## 2019-05-02 NOTE — ASSESSMENT & PLAN NOTE
She found to have pneumothorax and acute rib fracture with extensive subcutaneous emphysema within the right chest wall.  Patient is DNR/DNI.  ED physician and surgeon discussed with patient's guardian and expressed that patient is DNR/DNI and after discussion no invasive procedure was recommended.  I have personally discussed this case with surgeon Dr. Gregg.  Case has been discussed with the patient's DPOA third alternate who is at bedside and again with the patient's daughter who is the primary decision maker listed on the DPOA document.      Given the patient's end-stage dementia and high risk for clinical deterioration the family has elected to place the patient on comfort care. Hospice referral placed.

## 2019-05-03 VITALS
SYSTOLIC BLOOD PRESSURE: 131 MMHG | RESPIRATION RATE: 18 BRPM | WEIGHT: 110.01 LBS | DIASTOLIC BLOOD PRESSURE: 78 MMHG | OXYGEN SATURATION: 95 % | TEMPERATURE: 96.7 F | HEART RATE: 68 BPM

## 2019-05-03 PROBLEM — F03.90 DEMENTIA (HCC): Status: ACTIVE | Noted: 2019-05-03

## 2019-05-03 PROCEDURE — G0378 HOSPITAL OBSERVATION PER HR: HCPCS

## 2019-05-03 PROCEDURE — 700111 HCHG RX REV CODE 636 W/ 250 OVERRIDE (IP): Performed by: NURSE PRACTITIONER

## 2019-05-03 PROCEDURE — 99217 PR OBSERVATION CARE DISCHARGE: CPT | Performed by: HOSPITALIST

## 2019-05-03 PROCEDURE — 96376 TX/PRO/DX INJ SAME DRUG ADON: CPT

## 2019-05-03 RX ORDER — LORAZEPAM 2 MG/ML
1 CONCENTRATE ORAL
Qty: 30 ML
Start: 2019-05-03 | End: 2019-05-06

## 2019-05-03 RX ORDER — LORAZEPAM 2 MG/ML
1 INJECTION INTRAMUSCULAR
Refills: 0
Start: 2019-05-03 | End: 2019-05-06

## 2019-05-03 RX ADMIN — LORAZEPAM 1 MG: 2 INJECTION INTRAMUSCULAR at 02:33

## 2019-05-03 NOTE — CARE PLAN
Problem: Venous Thromboembolism (VTW)/Deep Vein Thrombosis (DVT) Prevention:  Goal: Patient will participate in Venous Thrombosis (VTE)/Deep Vein Thrombosis (DVT)Prevention Measures  Outcome: PROGRESSING SLOWER THAN EXPECTED  Pt unable to tolerate SCDs removing constantly despite restraints patient comfort care     Problem: Safety - Medical Restraint  Goal: Remains free of injury from restraints (Restraint for Interference with Medical Device)  INTERVENTIONS:  1. Determine that other, less restrictive measures have been tried or would not be effective before applying the restraint  2. Evaluate the patient's condition at the time of restraint application  3. Inform patient/family regarding the reason for restraint  4. Q2H: Monitor safety, psychosocial status, comfort, nutrition and hydration   Outcome: PROGRESSING SLOWER THAN EXPECTED  Pt pulling at lines attempting unsafe ambulation, and becoming violent toward staff during nursing care.

## 2019-05-03 NOTE — DISCHARGE SUMMARY
Discharge Summary    CHIEF COMPLAINT ON ADMISSION  Chief Complaint   Patient presents with   • Rib Injury   • Fall   • Chest Injury       Reason for Admission  EMS     CODE STATUS  Comfort Care/DNR    HPI & HOSPITAL COURSE  This is a 89 y.o. female transferred from the [facility] after falling at her long-term care facility at [facility] in [place]. Patient has history of end stage dementia, but has no other medical history on file. General surgery consult in the emergency room led to discussion with family/DPOA, who confirmed the patient did not want further aggressive medical intervention when she was lucid enough to make her wishes known. She was monitored overnight on IV fluids. She is non-communicative on exam and does not follow.  She is DNR/DNI.  She is high risk for aspiration pneumonia, community acquired pneumonia or respiratory deterioration given her untreated pneumothorax.    I discussed the case in detail with the patient's second alternate on the DPOA, who confirms the patients family wishes her to transition to comfort care.  However the definite decision was deferred to the primary decision maker for medical decisions, the patient's oldest daughter, who arrived at 5/2/2019 to discuss her care.  We discussed the irreversible course of dementia, as well as the appropriate treatment for her pneumothorax and, potentially, infectious processes, not excluding UTI or pneumonia.  She has discussed the case in detail with her sister and feels they agree the patient should be transitioned to comfort care.  They have filled out a physician order for life sustaining treatment form and requested consultation from hospice.  She can be transferred to her previous long-term care facility where the family does not wish for her to receive IV hydration or enteral feedings. She has been accepted by [facility]. She can be given oral nutrition for comfort and appropriate medication for  her level of care.     Therefore, she is discharged in guarded and stable condition to skilled nursing facility.    FOLLOW UP ITEMS POST DISCHARGE  None    DISCHARGE DIAGNOSES  Active Problems:    Traumatic pneumothorax POA: Yes    Agitation POA: Yes    Leukocytosis POA: Yes    Dementia POA: Yes  Resolved Problems:    * No resolved hospital problems. *      FOLLOW UP  No future appointments.  [address] [address]  [phone]          MEDICATIONS ON DISCHARGE     Medication List      START taking these medications      Instructions   * LORazepam 2 MG/ML Conc  Commonly known as:  ATIVAN   Place 0.5 mL under tongue every 1 hour as needed (Anxiety/Restlessness) for up to 3 days.  Dose:  1 mg     * LORazepam 2 MG/ML Soln  Commonly known as:  ATIVAN   0.5 mL by Intravenous route every 1 hour as needed (Anxiety/Restlessness) for up to 3 days.  Dose:  1 mg        * This list has 2 medication(s) that are the same as other medications prescribed for you. Read the directions carefully, and ask your doctor or other care provider to review them with you.            CONTINUE taking these medications      Instructions   BOOST PO   Take 1 Dose by mouth 2 Times a Day.  Dose:  1 Dose     haloperidol 0.5 MG Tabs  Commonly known as:  HALDOL   Take 0.5 mg by mouth 1 time daily as needed. Pt takes once daily before shower as needed  Dose:  0.5 mg        STOP taking these medications    acetaminophen 500 MG Tabs  Commonly known as:  TYLENOL     aspirin EC 81 MG Tbec  Commonly known as:  ECOTRIN     furosemide 40 MG Tabs  Commonly known as:  LASIX     potassium chloride SA 20 MEQ Tbcr  Commonly known as:  Kdur            Allergies  No Known Allergies    DIET  Orders Placed This Encounter   Procedures   • Diet NPO     Standing Status:   Standing     Number of Occurrences:   1     Order Specific Question:   Restrict to:     Answer:   Ice Chips [2]       ACTIVITY  As tolerated.  Weight bearing as  tolerated    LINES, DRAINS, AND WOUNDS  This is an automated list. Peripheral IVs will be removed prior to discharge.  Peripheral IV 05/02/19 22 G Right Forearm (Active)   Site Assessment Clean;Dry;Intact 5/3/2019  8:00 AM   Dressing Type Transparent;Occlusive 5/3/2019  8:00 AM   Line Status Scrubbed the hub prior to access;Flushed;Infusing 5/3/2019  8:00 AM   Dressing Status Clean;Dry;Intact 5/3/2019  8:00 AM   Dressing Intervention N/A 5/3/2019  8:00 AM   Date Primary Tubing Changed 05/02/19 5/2/2019  8:44 PM   NEXT Primary Tubing Change  05/04/19 5/2/2019  8:44 PM   Infiltration Grading ([facility]) 0 5/3/2019  8:00 AM   Phlebitis Scale ([facility] Only) 0 5/3/2019  8:00 AM          Peripheral IV 05/02/19 22 G Right Forearm (Active)   Site Assessment Clean;Dry;Intact 5/3/2019  8:00 AM   Dressing Type Transparent;Occlusive 5/3/2019  8:00 AM   Line Status Scrubbed the hub prior to access;Flushed;Infusing 5/3/2019  8:00 AM   Dressing Status Clean;Dry;Intact 5/3/2019  8:00 AM   Dressing Intervention N/A 5/3/2019  8:00 AM   Date Primary Tubing Changed 05/02/19 5/2/2019  8:44 PM   NEXT Primary Tubing Change  05/04/19 5/2/2019  8:44 PM   Infiltration Grading ([facility]) 0 5/3/2019  8:00 AM   Phlebitis Scale ([facility] Only) 0 5/3/2019  8:00 AM               MENTAL STATUS ON TRANSFER  Level of Consciousness: Confused  Orientation : Unable to Assess  Speech: Expressive Aphasia (Garbled speech )    CONSULTATIONS  Palliative    PROCEDURES  None    LABORATORY  Lab Results   Component Value Date    SODIUM 142 05/02/2019    POTASSIUM 4.0 05/02/2019    CHLORIDE 109 05/02/2019    CO2 24 05/02/2019    GLUCOSE 95 05/02/2019    BUN 16 05/02/2019    CREATININE 0.78 05/02/2019        Lab Results   Component Value Date    WBC 14.5 (H) 05/02/2019    HEMOGLOBIN 13.8 05/02/2019    HEMATOCRIT 42.5 05/02/2019    PLATELETCT 301 05/02/2019        Total time of the discharge process exceeds 36 minutes.

## 2019-05-03 NOTE — DISCHARGE PLANNING
Anticipated Discharge Disposition: Home with Hospice    Action: Called [facility] [phone] and spoke with Evan. Notified her of pt's transport time to return home with [facility]. Evan confirmed they are able to accept pt.     Barriers to Discharge: None    Plan: D/c home with [facility]

## 2019-05-03 NOTE — PROGRESS NOTES
2 Rn skin check with Sowmya STEVENS. Bilateral soft wrist restraints in place skin intact. Heel float boots in place skin intact and blanching. Pt unable to tolerate scds- pt comfort care. Bruising to right thoracic region. Bilateral elbows pink and blanching. Discoloration, scattered bruises and scattered abrasions to BLE and BUE. NO suspected pressure injury seen, Waffle overlay in place/ 2 q turns in place.

## 2019-05-03 NOTE — DISCHARGE INSTRUCTIONS
Discharge Instructions    Discharged to [facility] home by medical transportation with self. Discharged via ambulance, hospital escort: Yes.  Special equipment needed: Not Applicable    Be sure to schedule a follow-up appointment with your primary care doctor or any specialists as instructed.     Discharge Plan:   Diet Plan: Discussed  Activity Level: Discussed  Confirmed Follow up Appointment: No (Comments)  Medication Reconciliation Updated: Yes    I understand that a diet low in cholesterol, fat, and sodium is recommended for good health. Unless I have been given specific instructions below for another diet, I accept this instruction as my diet prescription.   Other diet: npo, advance as tolerated    Special Instructions: None    · Is patient discharged on Warfarin / Coumadin?   No     Depression / Suicide Risk    As you are discharged from this [facility] facility, it is important to learn how to keep safe from harming yourself.    Recognize the warning signs:  · Abrupt changes in personality, positive or negative- including increase in energy   · Giving away possessions  · Change in eating patterns- significant weight changes-  positive or negative  · Change in sleeping patterns- unable to sleep or sleeping all the time   · Unwillingness or inability to communicate  · Depression  · Unusual sadness, discouragement and loneliness  · Talk of wanting to die  · Neglect of personal appearance   · Rebelliousness- reckless behavior  · Withdrawal from people/activities they love  · Confusion- inability to concentrate     If you or a loved one observes any of these behaviors or has concerns about self-harm, here's what you can do:  · Talk about it- your feelings and reasons for harming yourself  · Remove any means that you might use to hurt yourself (examples: pills, rope, extension cords, firearm)  · Get professional help from the community (Mental Health, Substance Abuse, psychological counseling)  · Do not be alone:Call  your Safe Contact- someone whom you trust who will be there for you.  · Call your local CRISIS HOTLINE 472-7284 or 448-743-9676  · Call your local Children's Mobile Crisis Response Team [place] (499) 499-8323 or www.[employer]  · Call the toll free National Suicide Prevention Hotlines   · National Suicide Prevention Lifeline 899-873-LMAO (1753)  · National [employer] Network 800-SUICIDE (155-5842)

## 2019-05-03 NOTE — CARE PLAN
Problem: Safety  Goal: Will remain free from injury  Outcome: PROGRESSING AS EXPECTED  Safety precautions in place. Call light within reach. Bed is low and in locked position. Hourly rounding in place.     Problem: Skin Integrity  Goal: Risk for impaired skin integrity will decrease  Outcome: PROGRESSING AS EXPECTED  Q 2 turns in place. Waffle overlay and float boots in place. Skin evaluated q shift and prn

## 2019-05-03 NOTE — PALLIATIVE CARE
Palliative Care   Per chart review, GOC has already been established. Pt discharging to home today with [facility]. Will cancel order.           Thank you for allowing Palliative Care to participate in this patient's care. Please feel free to call [phone] with any questions or concerns.
